# Patient Record
Sex: FEMALE | Race: BLACK OR AFRICAN AMERICAN | NOT HISPANIC OR LATINO | Employment: STUDENT | URBAN - METROPOLITAN AREA
[De-identification: names, ages, dates, MRNs, and addresses within clinical notes are randomized per-mention and may not be internally consistent; named-entity substitution may affect disease eponyms.]

---

## 2017-01-18 ENCOUNTER — OFFICE VISIT (OUTPATIENT)
Dept: SPORTS MEDICINE | Facility: CLINIC | Age: 22
End: 2017-01-18
Payer: MEDICAID

## 2017-01-18 VITALS
BODY MASS INDEX: 28.25 KG/M2 | HEART RATE: 63 BPM | DIASTOLIC BLOOD PRESSURE: 71 MMHG | HEIGHT: 67 IN | WEIGHT: 180 LBS | SYSTOLIC BLOOD PRESSURE: 130 MMHG

## 2017-01-18 DIAGNOSIS — S80.02XS: ICD-10-CM

## 2017-01-18 DIAGNOSIS — M76.899 QUADRICEPS TENDINITIS: ICD-10-CM

## 2017-01-18 DIAGNOSIS — S80.02XD CONTUSION OF LEFT KNEE, SUBSEQUENT ENCOUNTER: Primary | ICD-10-CM

## 2017-01-18 DIAGNOSIS — M76.52 PATELLAR TENDINITIS OF LEFT KNEE: ICD-10-CM

## 2017-01-18 DIAGNOSIS — S80.02XD CONTUSION OF LEFT PATELLA, SUBSEQUENT ENCOUNTER: ICD-10-CM

## 2017-01-18 DIAGNOSIS — M25.562 ACUTE PAIN OF LEFT KNEE: ICD-10-CM

## 2017-01-18 PROCEDURE — 99213 OFFICE O/P EST LOW 20 MIN: CPT | Mod: PBBFAC,PO | Performed by: ORTHOPAEDIC SURGERY

## 2017-01-18 PROCEDURE — 99214 OFFICE O/P EST MOD 30 MIN: CPT | Mod: S$PBB,,, | Performed by: ORTHOPAEDIC SURGERY

## 2017-01-18 PROCEDURE — 99999 PR PBB SHADOW E&M-EST. PATIENT-LVL III: CPT | Mod: PBBFAC,,, | Performed by: ORTHOPAEDIC SURGERY

## 2017-01-18 RX ORDER — MELOXICAM 15 MG/1
15 TABLET ORAL 2 TIMES DAILY
Qty: 30 TABLET | Refills: 2 | Status: SHIPPED | OUTPATIENT
Start: 2017-01-18 | End: 2017-07-17

## 2017-01-18 NOTE — MR AVS SNAPSHOT
[facility]  [address]  [facility] [address]  Phone: [phone]                  Jody Cid   2017 2:15 PM   Office Visit    Description:  Female : 1995   Provider:  Uyen Avendaño MD   Department:  [facility]           Reason for Visit     Left Knee - Follow-up           Diagnoses this Visit        Comments    Contusion of left knee, subsequent encounter    -  Primary     Contusion of left patella, subsequent encounter         Patellar tendinitis of left knee         Acute pain of left knee         Quadriceps tendinitis         Contusion of left patella, sequela                To Do List           Goals (5 Years of Data)     None      Follow-Up and Disposition     Return in about 6 weeks (around 3/1/2017) for RTC in 6 weeks with Dr. Uyen Avendaño Patient will fill out IKDC, SF-12 and KOOS on return..       These Medications        Disp Refills Start End    meloxicam (MOBIC) 15 MG tablet 30 tablet 2 2017     Take 1 tablet (15 mg total) by mouth 2 (two) times daily. - Oral    Pharmacy: [facility] [address] [place] [address] - [address] Ph #: [phone]         [facility] On Call     [facility] On Call Nurse Care Line -  Assistance  Registered nurses in the [facility] Center provide clinical advisement, health education, appointment booking, and other advisory services.  Call for this free service at [phone].             Medications           Message regarding Medications     Verify the changes and/or additions to your medication regime listed below are the same as discussed with your clinician today.  If any of these changes or additions are incorrect, please notify your healthcare provider.        CHANGE how you are taking these medications     Start Taking Instead of    meloxicam (MOBIC) 15 MG tablet meloxicam (MOBIC) 15 MG tablet    Dosage:  Take 1 tablet (15 mg total) by mouth 2 (two) times daily. Dosage:  Take 1  "tablet (15 mg total) by mouth once daily.    Reason for Change:  Reorder            Verify that the below list of medications is an accurate representation of the medications you are currently taking.  If none reported, the list may be blank. If incorrect, please contact your healthcare provider. Carry this list with you in case of emergency.           Current Medications     esomeprazole (NEXIUM) 40 MG capsule Take 1 capsule (40 mg total) by mouth before breakfast.    esomeprazole (NEXIUM) 40 mg GrPS Take 40 mg by mouth before breakfast.    indomethacin (INDOCIN SR) 75 mg CpSR CR capsule Take 1 capsule (75 mg total) by mouth 2 (two) times daily.    meloxicam (MOBIC) 15 MG tablet Take 1 tablet (15 mg total) by mouth 2 (two) times daily.           Clinical Reference Information           Vital Signs - Last Recorded  Most recent update: 1/18/2017  3:09 PM by Natalya Mcgarry MA    BP Pulse Ht Wt BMI    130/71 (96 %/ 70 %)* 63 5' 7" (1.702 m) (86 %, Z= 1.07) 81.6 kg (180 lb) (95 %, Z= 1.61) 28.19 kg/m2 (91 %, Z= 1.31)    *BP percentiles are based on NHBPEP's 4th Report    Growth percentiles are based on CDC 2-20 Years data.      Blood Pressure          Most Recent Value    BP  130/71      Allergies as of 1/18/2017     No Known Allergies      Immunizations Administered on Date of Encounter - 1/18/2017     None      Orders Placed During Today's Visit      Normal Orders This Visit    Ambulatory Referral to Physical/Occupational Therapy       [facility] Sign-Up     Activating your [facility] account is as easy as 1-2-3!     1) Visit [email or web address], select Sign Up Now, enter this activation code and your date of birth, then select Next.  [email or web address]  Expires: 1/27/2017  1:01 PM      2) Create a username and password to use when you visit [facility] in the future and select a security question in case you lose your password and select Next.    3) Enter your e-mail address and click Sign Up!    Additional " Information  If you have questions, please e-mail [email or web address] or call [phone] to talk to our [facility] staff. Remember, [facility] is NOT to be used for urgent needs. For medical emergencies, dial 911.

## 2017-01-18 NOTE — PROGRESS NOTES
Subjective:          Chief Complaint: Jody Cid is a 21 y.o. female who had concerns including Follow-up of the Left Knee.    HPI Comments: Patient is here for a follow up for her left knee. She is still using crutches and resting. Medicaid denied the exogen bone stimulator. She has not been doing exercises since her last visit.     She got her  brace with [employer] today. She is able to weight bear more comfortably with the  brace.     Handedness: right-handed  Sport played: basketball      Level: college      Position:gatitid          Review of Systems   Constitution: Negative for fever and night sweats.   HENT: Negative for hearing loss.    Eyes: Negative for blurred vision and visual disturbance.   Cardiovascular: Negative for chest pain and leg swelling.   Respiratory: Negative for shortness of breath.    Endocrine: Negative for polyuria.   Hematologic/Lymphatic: Negative for bleeding problem.   Skin: Negative for rash.   Musculoskeletal: Negative for back pain, joint pain, joint swelling, muscle cramps and muscle weakness.   Gastrointestinal: Negative for melena.   Genitourinary: Negative for hematuria.   Neurological: Negative for loss of balance, numbness and paresthesias.   Psychiatric/Behavioral: Negative for altered mental status.       Pain Related Questions  Over the past 3 days, what was your average pain during activity? (I.e. running, jogging, walking, climbing stairs, getting dressed, ect.): 5  Over the past 3 days, what was your highest pain level?: 7  Over the past 3 days, what was your lowest pain level? : 3    Other  How many nights a week are you awakened by your affected body part?: 7  Was the patient's HEIGHT measured or patient reported?: Patient Reported  Was the patient's WEIGHT measured or patient reported?: Measured      Objective:        General: Jody is well-developed, well-nourished, appears stated age, in no acute distress, alert and oriented to time, place  and person.     General    Vitals reviewed.  Constitutional: She is oriented to person, place, and time. She appears well-developed and well-nourished. No distress.   HENT:   Mouth/Throat: No oropharyngeal exudate.   Eyes: Right eye exhibits no discharge. Left eye exhibits no discharge.   Neck: Normal range of motion.   Pulmonary/Chest: Effort normal and breath sounds normal. No respiratory distress.   Neurological: She is alert and oriented to person, place, and time. She has normal reflexes. No cranial nerve deficit. Coordination normal.   Psychiatric: She has a normal mood and affect. Her behavior is normal. Judgment and thought content normal.     General Musculoskeletal Exam   Gait: normal       Right Knee Exam     Inspection   Erythema: absent  Scars: absent  Swelling: absent  Effusion: effusion  Deformity: deformity  Bruising: absent    Tenderness   The patient is experiencing no tenderness.         Range of Motion   Extension: 0   Right knee flexion: 135.     Tests   Meniscus   Elijah:  Medial - negative Lateral - negative  Ligament Examination Lachman: normal (-1 to 2mm) PCL-Posterior Drawer: normal (0 to 2mm)     MCL - Valgus: normal (0 to 2mm)  LCL - Varus: normalPivot Shift: normal (Equal)Reverse Pivot Shift: normal (Equal)Dial Test at 30 degrees: normal (< 5 degrees)Dial Test at 90 degrees: normal (< 5 degrees)  Posterior Sag Test: negative  Posterolateral Corner: unstable (>15 degrees difference)  Patella   Patellar Apprehension: negative  Passive Patellar Tilt: neutral  Patellar Tracking: normal  Patellar Glide (quadrants): Lateral - 1   Medial - 2  Q-Angle at 90 degrees: normal  Patellar Grind: negative  J-Sign: none    Other   Meniscal Cyst: absent  Popliteal (Baker's) Cyst: absent  Sensation: normal    Left Knee Exam     Inspection   Erythema: absent  Scars: present  Swelling: absent  Effusion: absent  Deformity: deformity  Bruising: absent    Tenderness   The patient tender to palpation of the  lateral joint line, lateral retinaculum and tibial tubercle.    Range of Motion   Extension:  5 abnormal   Flexion:  110 abnormal     Tests   Meniscus   Elijah:  Medial - negative Lateral - negative  Stability Lachman: normal (-1 to 2mm) PCL-Posterior Drawer: normal (0 to 2mm)  MCL - Valgus: normal (0 to 2mm)  LCL - Varus: normal (0 to 2mm)Pivot Shift: normal (Equal)Reverse Pivot Shift: normal (Equal)Dial Test at 30 degrees: normal (< 5 degrees)Dial Test at 90 degrees: normal (< 5 degrees)  Posterior Sag Test: negative  Posterolateral Corner: unstable (>15 degrees difference)  Patella   Patellar Apprehension: negative  Passive Patellar Tilt: neutral  Patellar Tracking: normal  Patellar Glide (Quadrants): Lateral - 1 Medial - 2  Q-Angle at 90 degrees: normal  Patellar Grind: negative  J-Sign: J sign absent    Other   Meniscal Cyst: absent  Popliteal (Baker's) Cyst: absent  Sensation: normal    Right Hip Exam     Tests   Deandra: negative  Left Hip Exam     Tests   Deandra: negative          Muscle Strength   Right Lower Extremity   Hip Abduction: 5/5   Quadriceps:  5/5   Hamstrin/5   Left Lower Extremity   Hip Abduction: 5/5   Quadriceps:  4/5   Hamstrin/5     Reflexes     Left Side  Quadriceps:  2+  Achilles:  2+    Right Side   Quadriceps:  2+  Achilles:  2+    Vascular Exam     Right Pulses  Dorsalis Pedis:      2+  Posterior Tibial:      2+        Left Pulses  Dorsalis Pedis:      2+  Posterior Tibial:      2+        Edema  Right Lower Leg: absent  Left Lower Leg: absent              Assessment:       No diagnosis found.       Plan:         1. IKDC, SF-12 and KOOS was not filled out today in clinic.     RTC in 6 weeks with Dr. Uyen Avendaño Patient will fill out IKDC, SF-12 and KOOS on return.    2. Initiate PT at this time - [facility] [place]    3. Wear  brace for all weight bearing - increase to WBAT over the next 2 weeks    4. If Symptoms continue, may consider arthroscopy                             [facility] patient questionnaires have been collected today.

## 2017-03-08 ENCOUNTER — OFFICE VISIT (OUTPATIENT)
Dept: SPORTS MEDICINE | Facility: CLINIC | Age: 22
End: 2017-03-08
Payer: MEDICAID

## 2017-03-08 DIAGNOSIS — G89.29 CHRONIC PAIN OF LEFT KNEE: ICD-10-CM

## 2017-03-08 DIAGNOSIS — S80.02XS: ICD-10-CM

## 2017-03-08 DIAGNOSIS — M76.899 QUADRICEPS TENDINITIS: ICD-10-CM

## 2017-03-08 DIAGNOSIS — M25.562 CHRONIC PAIN OF LEFT KNEE: ICD-10-CM

## 2017-03-08 DIAGNOSIS — M76.52 PATELLAR TENDINITIS OF LEFT KNEE: ICD-10-CM

## 2017-03-08 DIAGNOSIS — M23.92 INTERNAL DERANGEMENT OF LEFT KNEE: ICD-10-CM

## 2017-03-08 DIAGNOSIS — S80.02XS CONTUSION OF LEFT KNEE, SEQUELA: Primary | ICD-10-CM

## 2017-03-08 PROCEDURE — 99212 OFFICE O/P EST SF 10 MIN: CPT | Mod: PBBFAC,PO | Performed by: ORTHOPAEDIC SURGERY

## 2017-03-08 PROCEDURE — 99214 OFFICE O/P EST MOD 30 MIN: CPT | Mod: S$PBB,,, | Performed by: ORTHOPAEDIC SURGERY

## 2017-03-08 PROCEDURE — 99999 PR PBB SHADOW E&M-EST. PATIENT-LVL II: CPT | Mod: PBBFAC,,, | Performed by: ORTHOPAEDIC SURGERY

## 2017-03-08 NOTE — PROGRESS NOTES
Subjective:          Chief Complaint: Jody Cid is a 21 y.o. female who had no chief complaint listed for this encounter.    HPI Comments: Patient is here for a follow up for her left knee. She did not go to physical therapy after her last visit. She still wears the  brace. She has been working out on her own. She has been running and shooting a little bit. She states that her pain is more of an ache. She walks with a limp. She is not using her crutches.     Handedness: right-handed  Sport played: basketball      Level: college      Position:gaurd          Review of Systems   Constitution: Negative for fever and night sweats.   HENT: Negative for hearing loss.    Eyes: Negative for blurred vision and visual disturbance.   Cardiovascular: Negative for chest pain and leg swelling.   Respiratory: Negative for shortness of breath.    Endocrine: Negative for polyuria.   Hematologic/Lymphatic: Negative for bleeding problem.   Skin: Negative for rash.   Musculoskeletal: Negative for back pain, joint pain, joint swelling, muscle cramps and muscle weakness.   Gastrointestinal: Negative for melena.   Genitourinary: Negative for hematuria.   Neurological: Negative for loss of balance, numbness and paresthesias.   Psychiatric/Behavioral: Negative for altered mental status.                   Objective:        General: Jody is well-developed, well-nourished, appears stated age, in no acute distress, alert and oriented to time, place and person.     General    Vitals reviewed.  Constitutional: She is oriented to person, place, and time. She appears well-developed and well-nourished. No distress.   HENT:   Mouth/Throat: No oropharyngeal exudate.   Eyes: Right eye exhibits no discharge. Left eye exhibits no discharge.   Neck: Normal range of motion.   Pulmonary/Chest: Effort normal and breath sounds normal. No respiratory distress.   Neurological: She is alert and oriented to person, place, and time. She  has normal reflexes. No cranial nerve deficit. Coordination normal.   Psychiatric: She has a normal mood and affect. Her behavior is normal. Judgment and thought content normal.     General Musculoskeletal Exam   Gait: normal       Right Knee Exam     Inspection   Erythema: absent  Scars: absent  Swelling: absent  Effusion: effusion  Deformity: deformity  Bruising: absent    Tenderness   The patient is experiencing no tenderness.         Range of Motion   Extension: 5   Flexion: 140     Tests   Meniscus   Elijah:  Medial - negative Lateral - negative  Ligament Examination Lachman: normal (-1 to 2mm) PCL-Posterior Drawer: normal (0 to 2mm)     MCL - Valgus: normal (0 to 2mm)  LCL - Varus: normalPivot Shift: normal (Equal)Reverse Pivot Shift: normal (Equal)Dial Test at 30 degrees: normal (< 5 degrees)Dial Test at 90 degrees: normal (< 5 degrees)  Posterior Sag Test: negative  Posterolateral Corner: unstable (>15 degrees difference)  Patella   Patellar Apprehension: negative  Passive Patellar Tilt: neutral  Patellar Tracking: normal  Patellar Glide (quadrants): Lateral - 1   Medial - 2  Q-Angle at 90 degrees: normal  Patellar Grind: negative  J-Sign: none    Other   Meniscal Cyst: absent  Popliteal (Baker's) Cyst: absent  Sensation: normal    Left Knee Exam     Inspection   Erythema: absent  Scars: present  Swelling: absent  Effusion: absent  Deformity: deformity  Bruising: absent    Tenderness   The patient tender to palpation of the lateral joint line, lateral retinaculum, tibial tubercle and patella.    Crepitus   The patient has crepitus of the patella.    Range of Motion   Extension: 10   Flexion: 140     Tests   Meniscus   Elijah:  Medial - negative Lateral - negative  Stability Lachman: normal (-1 to 2mm) PCL-Posterior Drawer: normal (0 to 2mm)  MCL - Valgus: normal (0 to 2mm)  LCL - Varus: normal (0 to 2mm)Pivot Shift: normal (Equal)Reverse Pivot Shift: normal (Equal)Dial Test at 30 degrees: normal (< 5  degrees)Dial Test at 90 degrees: normal (< 5 degrees)  Posterior Sag Test: negative  Posterolateral Corner: unstable (>15 degrees difference)  Patella   Patellar Apprehension: negative  Passive Patellar Tilt: neutral  Patellar Tracking: normal  Patellar Glide (Quadrants): Lateral - 1 Medial - 2  Q-Angle at 90 degrees: normal  Patellar Grind: positive  J-Sign: J sign absent    Other   Meniscal Cyst: absent  Popliteal (Baker's) Cyst: absent  Sensation: normal    Right Hip Exam     Tests   Deandra: negative  Left Hip Exam     Tests   Deandra: negative          Muscle Strength   Right Lower Extremity   Hip Abduction: 5/5   Quadriceps:  5/5   Hamstrin/5   Left Lower Extremity   Hip Abduction: 5/5   Quadriceps:  4/5   Hamstrin/5     Reflexes     Left Side  Quadriceps:  2+  Achilles:  2+    Right Side   Quadriceps:  2+  Achilles:  2+    Vascular Exam     Right Pulses  Dorsalis Pedis:      2+  Posterior Tibial:      2+        Left Pulses  Dorsalis Pedis:      2+  Posterior Tibial:      2+        Edema  Right Lower Leg: absent  Left Lower Leg: absent              Assessment:       Encounter Diagnoses   Name Primary?    Contusion of left knee, sequela Yes    Contusion of left patella, sequela     Quadriceps tendinitis     Patellar tendinitis of left knee     Internal derangement of left knee     Chronic pain of left knee           Plan:         1. IKDC, SF-12 and KOOS was filled out today in clinic.     RTC in 3months with Dr. Uyen Avendaño Patient will fill out IKDC, SF-12 and KOOS on return.    2. [facility] PT per protocol - new referral placed today    3. Continue  brace for all weight bearing                                 [facility] patient questionnaires have been collected today.

## 2017-03-08 NOTE — MR AVS SNAPSHOT
[facility]  [address]  [facility] [address]  Phone: [phone]                  Jody Cid   3/8/2017 3:15 PM   Office Visit    Description:  Female : 1995   Provider:  Uyen Avendaño MD   Department:  [facility]           Diagnoses this Visit        Comments    Contusion of left knee, sequela    -  Primary     Contusion of left patella, sequela         Quadriceps tendinitis         Patellar tendinitis of left knee         Internal derangement of left knee         Chronic pain of left knee                To Do List           Goals (5 Years of Data)     None      Follow-Up and Disposition     Return in about 3 months (around 2017) for RTC in 3months with Dr. Uyen Avendaño Patient will fill out IKDC, SF-12 and KOOS on return..      [facility]     [facility] On Call Nurse Care Line -  Assistance  Registered nurses in the [facility] On Call Center provide clinical advisement, health education, appointment booking, and other advisory services.  Call for this free service at [phone].             Medications           Message regarding Medications     Verify the changes and/or additions to your medication regime listed below are the same as discussed with your clinician today.  If any of these changes or additions are incorrect, please notify your healthcare provider.             Verify that the below list of medications is an accurate representation of the medications you are currently taking.  If none reported, the list may be blank. If incorrect, please contact your healthcare provider. Carry this list with you in case of emergency.           Current Medications     esomeprazole (NEXIUM) 40 MG capsule Take 1 capsule (40 mg total) by mouth before breakfast.    esomeprazole (NEXIUM) 40 mg GrPS Take 40 mg by mouth before breakfast.    indomethacin (INDOCIN SR) 75 mg CpSR CR capsule Take 1 capsule (75 mg total) by mouth 2 (two) times  daily.    meloxicam (MOBIC) 15 MG tablet Take 1 tablet (15 mg total) by mouth 2 (two) times daily.           Clinical Reference Information           Allergies as of 3/8/2017     No Known Allergies      Immunizations Administered on Date of Encounter - 3/8/2017     None      Orders Placed During Today's Visit      Normal Orders This Visit    Ambulatory Referral to Physical/Occupational Therapy       [facility] Sign-Up     Activating your [facility] account is as easy as 1-2-3!     1) Visit [email or web address], select Sign Up Now, enter this activation code and your date of birth, then select Next.  [email or web address]  Expires: 4/21/2017  2:09 PM      2) Create a username and password to use when you visit [facility] in the future and select a security question in case you lose your password and select Next.    3) Enter your e-mail address and click Sign Up!    Additional Information  If you have questions, please e-mail [email or web address] or call [phone] to talk to our [facility] staff. Remember, [facility] is NOT to be used for urgent needs. For medical emergencies, dial 911.         Language Assistance Services     ATTENTION: Language assistance services are available, free of charge. Please call [phone].      ATENCIÓN: Si habla español, tiene a colon disposición servicios gratuitos de asistencia lingüística. Llame al [phone].     MIHAELA Ý: N?u b?n nói Ti?ng Vi?t, có các d?ch v? h? tr? ngôn ng? mi?n phí dành cho b?n. G?i s? [phone].         [facility] complies with applicable Federal civil rights laws and does not discriminate on the basis of race, color, national origin, age, disability, or sex.

## 2017-07-17 ENCOUNTER — OFFICE VISIT (OUTPATIENT)
Dept: SPORTS MEDICINE | Facility: CLINIC | Age: 22
End: 2017-07-17
Payer: MEDICAID

## 2017-07-17 VITALS
WEIGHT: 175 LBS | SYSTOLIC BLOOD PRESSURE: 127 MMHG | BODY MASS INDEX: 27.47 KG/M2 | HEIGHT: 67 IN | HEART RATE: 62 BPM | DIASTOLIC BLOOD PRESSURE: 69 MMHG

## 2017-07-17 DIAGNOSIS — S80.02XD CONTUSION OF LEFT KNEE, SUBSEQUENT ENCOUNTER: Primary | ICD-10-CM

## 2017-07-17 DIAGNOSIS — M23.92 INTERNAL DERANGEMENT OF LEFT KNEE: ICD-10-CM

## 2017-07-17 DIAGNOSIS — S80.02XD CONTUSION OF LEFT PATELLA, SUBSEQUENT ENCOUNTER: ICD-10-CM

## 2017-07-17 PROCEDURE — 99999 PR PBB SHADOW E&M-EST. PATIENT-LVL III: CPT | Mod: PBBFAC,,, | Performed by: ORTHOPAEDIC SURGERY

## 2017-07-17 PROCEDURE — 99214 OFFICE O/P EST MOD 30 MIN: CPT | Mod: 25,S$PBB,, | Performed by: ORTHOPAEDIC SURGERY

## 2017-07-17 PROCEDURE — 97110 THERAPEUTIC EXERCISES: CPT | Mod: GP,S$PBB,, | Performed by: ORTHOPAEDIC SURGERY

## 2017-07-17 PROCEDURE — 99213 OFFICE O/P EST LOW 20 MIN: CPT | Mod: PBBFAC,PO | Performed by: ORTHOPAEDIC SURGERY

## 2017-07-17 NOTE — PROGRESS NOTES
Subjective:          Chief Complaint: Jody Cid is a 21 y.o. female who had concerns including Follow-up of the Left Knee.    Patient is here for a follow up for her left knee. She is doing better since physical therapy but her knee will bother her when she stands for long periods of time.      She plans to transfer to [employer] to play basketball next year      Handedness: right-handed  Sport played: basketball      Level: college      Position:gaurd          Review of Systems   Constitution: Negative for fever and night sweats.   HENT: Negative for hearing loss.    Eyes: Negative for blurred vision and visual disturbance.   Cardiovascular: Negative for chest pain and leg swelling.   Respiratory: Negative for shortness of breath.    Endocrine: Negative for polyuria.   Hematologic/Lymphatic: Negative for bleeding problem.   Skin: Negative for rash.   Musculoskeletal: Negative for back pain, joint pain, joint swelling, muscle cramps and muscle weakness.   Gastrointestinal: Negative for melena.   Genitourinary: Negative for hematuria.   Neurological: Negative for loss of balance, numbness and paresthesias.   Psychiatric/Behavioral: Negative for altered mental status.       Pain Related Questions  Over the past 3 days, what was your average pain during activity? (I.e. running, jogging, walking, climbing stairs, getting dressed, ect.): 1  Over the past 3 days, what was your highest pain level?: 2  Over the past 3 days, what was your lowest pain level? : 0    Other  How many nights a week are you awakened by your affected body part?: 0  Was the patient's HEIGHT measured or patient reported?: Patient Reported  Was the patient's WEIGHT measured or patient reported?: Measured      Objective:        General: Jody is well-developed, well-nourished, appears stated age, in no acute distress, alert and oriented to time, place and person.     General    Vitals reviewed.  Constitutional: She is oriented to person,  place, and time. She appears well-developed and well-nourished. No distress.   HENT:   Mouth/Throat: No oropharyngeal exudate.   Eyes: Right eye exhibits no discharge. Left eye exhibits no discharge.   Neck: Normal range of motion.   Pulmonary/Chest: Effort normal and breath sounds normal. No respiratory distress.   Neurological: She is alert and oriented to person, place, and time. She has normal reflexes. No cranial nerve deficit. Coordination normal.   Psychiatric: She has a normal mood and affect. Her behavior is normal. Judgment and thought content normal.     General Musculoskeletal Exam   Gait: normal       Right Knee Exam     Inspection   Erythema: absent  Scars: absent  Swelling: absent  Effusion: effusion  Deformity: deformity  Bruising: absent    Tenderness   The patient is experiencing no tenderness.         Crepitus   The patient has crepitus of the patella.    Range of Motion   Extension: -10   Flexion: 130     Tests   Meniscus   Elijah:  Medial - negative Lateral - negative  Ligament Examination Lachman: normal (-1 to 2mm) PCL-Posterior Drawer: normal (0 to 2mm)     MCL - Valgus: normal (0 to 2mm)  LCL - Varus: normalPivot Shift: normal (Equal)Reverse Pivot Shift: normal (Equal)Dial Test at 30 degrees: normal (< 5 degrees)Dial Test at 90 degrees: normal (< 5 degrees)  Posterior Sag Test: negative  Posterolateral Corner: unstable (>15 degrees difference)  Patella   Patellar Apprehension: negative  Passive Patellar Tilt: neutral  Patellar Tracking: normal  Patellar Glide (quadrants): Lateral - 1   Medial - 2  Q-Angle at 90 degrees: normal  Patellar Grind: negative  J-Sign: none    Other   Meniscal Cyst: absent  Popliteal (Baker's) Cyst: absent  Sensation: normal    Left Knee Exam     Inspection   Erythema: absent  Scars: present  Swelling: absent  Effusion: absent  Deformity: deformity  Bruising: absent    Tenderness   The patient tender to palpation of the lateral joint line, lateral retinaculum,  tibial tubercle and patella.    Crepitus   The patient has crepitus of the patella.    Range of Motion   Extension: -10   Flexion: 130     Tests   Meniscus   Elijah:  Medial - negative Lateral - negative  Stability Lachman: normal (-1 to 2mm) PCL-Posterior Drawer: normal (0 to 2mm)  MCL - Valgus: normal (0 to 2mm)  LCL - Varus: normal (0 to 2mm)Pivot Shift: normal (Equal)Reverse Pivot Shift: normal (Equal)Dial Test at 30 degrees: normal (< 5 degrees)Dial Test at 90 degrees: normal (< 5 degrees)  Posterior Sag Test: negative  Posterolateral Corner: unstable (>15 degrees difference)  Patella   Patellar Apprehension: negative  Passive Patellar Tilt: neutral  Patellar Tracking: normal  Patellar Glide (Quadrants): Lateral - 1 Medial - 2  Q-Angle at 90 degrees: normal  Patellar Grind: negative  J-Sign: J sign absent    Other   Meniscal Cyst: absent  Popliteal (Baker's) Cyst: absent  Sensation: normal    Right Hip Exam     Tests   Deandra: negative  Left Hip Exam     Tests   Deandra: negative          Muscle Strength   Right Lower Extremity   Hip Abduction: 5/5   Quadriceps:  5/5   Hamstrin/5   Left Lower Extremity   Hip Abduction: 5/5   Quadriceps:  4/5   Hamstrin/5     Reflexes     Left Side  Quadriceps:  2+  Achilles:  2+    Right Side   Quadriceps:  2+  Achilles:  2+    Vascular Exam     Right Pulses  Dorsalis Pedis:      2+  Posterior Tibial:      2+        Left Pulses  Dorsalis Pedis:      2+  Posterior Tibial:      2+        Edema  Right Lower Leg: absent  Left Lower Leg: absent              Assessment:       Encounter Diagnoses   Name Primary?    Contusion of left knee, subsequent encounter Yes    Contusion of left patella, subsequent encounter     Internal derangement of left knee           Plan:         1. IKDC, SF-12 and KOOS was filled out today in clinic.     RTC in 3 months with Dr. Uyen Avendaño Patient will fill out IKDC, SF-12 and KOOS and bilateral knee series on return.    2. [facility] [id number] - Uyen  MD Jarocho and Ginny Landaverde, instructed and demonstrated a core/gluteal strengthening HEP. The patient then demonstrated understanding of exercises and proper technique. This program was performed for 15 minutes.     3. [facility] - sports specific training                                [facility] patient questionnaires have been collected today.

## 2018-10-22 ENCOUNTER — OFFICE VISIT (OUTPATIENT)
Dept: ORTHOPEDICS | Facility: CLINIC | Age: 23
End: 2018-10-22
Payer: MEDICAID

## 2018-10-22 ENCOUNTER — HOSPITAL ENCOUNTER (OUTPATIENT)
Dept: RADIOLOGY | Facility: HOSPITAL | Age: 23
Discharge: HOME | End: 2018-10-22
Attending: PHYSICIAN ASSISTANT
Payer: MEDICAID

## 2018-10-22 VITALS
DIASTOLIC BLOOD PRESSURE: 62 MMHG | WEIGHT: 175.06 LBS | SYSTOLIC BLOOD PRESSURE: 97 MMHG | HEIGHT: 67 IN | HEART RATE: 51 BPM | BODY MASS INDEX: 27.48 KG/M2

## 2018-10-22 DIAGNOSIS — M79.641 RIGHT HAND PAIN: Primary | ICD-10-CM

## 2018-10-22 DIAGNOSIS — M79.641 RIGHT HAND PAIN: ICD-10-CM

## 2018-10-22 DIAGNOSIS — M79.644 FINGER PAIN, RIGHT: ICD-10-CM

## 2018-10-22 DIAGNOSIS — M79.641 PAIN OF RIGHT HAND: ICD-10-CM

## 2018-10-22 DIAGNOSIS — M79.644 PAIN OF FINGER OF RIGHT HAND: Primary | ICD-10-CM

## 2018-10-22 DIAGNOSIS — S63.264A: Primary | ICD-10-CM

## 2018-10-22 DIAGNOSIS — M79.641 PAIN OF RIGHT HAND: Primary | ICD-10-CM

## 2018-10-22 PROBLEM — S63.269A: Status: ACTIVE | Noted: 2018-10-22

## 2018-10-22 PROCEDURE — 73130 X-RAY EXAM OF HAND: CPT | Mod: 26,RT,, | Performed by: RADIOLOGY

## 2018-10-22 PROCEDURE — 99213 OFFICE O/P EST LOW 20 MIN: CPT | Mod: PBBFAC,25,PO | Performed by: PHYSICIAN ASSISTANT

## 2018-10-22 PROCEDURE — 73130 X-RAY EXAM OF HAND: CPT | Mod: TC,FY,PO,RT

## 2018-10-22 PROCEDURE — 73130 X-RAY EXAM OF HAND: CPT | Mod: 26,XS,RT, | Performed by: RADIOLOGY

## 2018-10-22 PROCEDURE — 99999 PR PBB SHADOW E&M-EST. PATIENT-LVL III: CPT | Mod: PBBFAC,,, | Performed by: PHYSICIAN ASSISTANT

## 2018-10-22 PROCEDURE — 29131 APPL FINGER SPLINT DYNAMIC: CPT | Mod: PBBFAC,PO | Performed by: PHYSICIAN ASSISTANT

## 2018-10-22 PROCEDURE — 99214 OFFICE O/P EST MOD 30 MIN: CPT | Mod: S$PBB,25,, | Performed by: PHYSICIAN ASSISTANT

## 2018-10-22 PROCEDURE — 29131 APPL FINGER SPLINT DYNAMIC: CPT | Mod: S$PBB,F8,, | Performed by: PHYSICIAN ASSISTANT

## 2018-10-22 RX ORDER — MELOXICAM 15 MG/1
15 TABLET ORAL DAILY
Qty: 30 TABLET | Refills: 2 | Status: SHIPPED | OUTPATIENT
Start: 2018-10-22 | End: 2018-11-21

## 2018-10-22 NOTE — LETTER
October 22, 2018      Luis Perdomo   [address] [facility] [address]  [place] [address]           [facility]  [address]  [place] [address]  Phone: [phone]  Fax: [phone]          Patient: Jody Cid   MR Number: [id number]   YOB: 1995   Date of Visit: 10/22/2018       Dear Luis Perdomo :    Thank you for referring Jody Cid to me for evaluation. Attached you will find relevant portions of my assessment and plan of care.    If you have questions, please do not hesitate to call me. I look forward to following Jody Cid along with you.    Sincerely,    Jyoti Borja PA-C    Enclosure  CC:  No Recipients    If you would like to receive this communication electronically, please contact [email or web address] or [phone] to request more information on [facility] access.    For providers and/or their staff who would like to refer a patient to [facility], please contact us through our one-stop-shop provider referral line, [facility], at [phone].    If you feel you have received this communication in error or would no longer like to receive these types of communications, please e-mail [email or web address]

## 2018-10-22 NOTE — PROGRESS NOTES
Subjective:     Patient ID: Jody Cdi is a 23 y.o. female.    Chief Complaint: Injury and Pain of the Right Hand    Ms. Cid is a 23-year-old female who presents for right ring finger pain. 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  She presents with the [facility] . Around 10 a.m. this morning, she was guarding a teammate and her right ring finger dislocated.  Her finger was reduced by the [facility] .  The  reports a volar dislocation at site of MCP joint of the right ring finger.  The  reports associated dorsal skin depression to right ring finger MCP joint. The patient complains of numbness ring finger and swelling.  The patient denies tingling.  She rates her pain as an 8/10.  She has no history of dislocation in the past.  Ice was applied to the finger in transit to our office.         History reviewed. No pertinent past medical history.  History reviewed. No pertinent surgical history.  History reviewed. No pertinent family history.  Social History     Socioeconomic History    Marital status: Single     Spouse name: Not on file    Number of children: Not on file    Years of education: Not on file    Highest education level: Not on file   Social Needs    Financial resource strain: Not on file    Food insecurity - worry: Not on file    Food insecurity - inability: Not on file    Transportation needs - medical: Not on file    Transportation needs - non-medical: Not on file   Occupational History    Not on file   Tobacco Use    Smoking status: Never Smoker    Smokeless tobacco: Never Used   Substance and Sexual Activity    Alcohol use: No     Alcohol/week: 0.0 oz     Frequency: Never    Drug use: No    Sexual activity: Not on file   Other Topics Concern    Not on file   Social History Narrative    Not on file        Medication List      as of 10/22/2018 11:28 AM     You have not been prescribed any medications.       Review of patient's  allergies indicates:  No Known Allergies  Review of Systems   Constitution: Negative for fever and night sweats.   HENT: Negative for hearing loss.    Eyes: Negative for blurred vision and visual disturbance.   Cardiovascular: Negative for chest pain and leg swelling.   Respiratory: Negative for shortness of breath.    Endocrine: Negative for polyuria.   Hematologic/Lymphatic: Negative for bleeding problem.   Skin: Negative for rash.   Musculoskeletal: Positive for joint pain and joint swelling. Negative for back pain, muscle cramps and muscle weakness.   Gastrointestinal: Negative for melena.   Genitourinary: Negative for hematuria.   Neurological: Negative for loss of balance, numbness and paresthesias.   Psychiatric/Behavioral: Negative for altered mental status.       Objective:   Body mass index is 27.42 kg/m².  Vitals:    10/22/18 1109   BP: 97/62   Pulse: (!) 51       General: Jody is well-developed, well-nourished, appears stated age, in no acute distress, alert and oriented to time, place and person.       General    Constitutional: She is oriented to person, place, and time. She appears well-developed and well-nourished.   HENT:   Head: Normocephalic and atraumatic.   Right Ear: External ear normal.   Left Ear: External ear normal.   Nose: Nose normal.   Eyes: EOM are normal. Pupils are equal, round, and reactive to light. Right eye exhibits no discharge. Left eye exhibits no discharge.   Neck: Normal range of motion.   Cardiovascular: Intact distal pulses.    Pulmonary/Chest: Effort normal. No respiratory distress.   Abdominal: Soft.   Neurological: She is alert and oriented to person, place, and time. She exhibits normal muscle tone.   Psychiatric: She has a normal mood and affect. Her behavior is normal. Judgment and thought content normal.             Right Hand/Wrist Exam     Inspection   Scars: Wrist - absent Hand -  absent  Effusion: Wrist - absent Hand -  absent  Bruising: Wrist - absent Hand -   absent  Deformity: Wrist - deformity Hand -  deformity    Pain   Hand - The patient exhibits pain of the ring MCP.    Swelling   Hand - The patient is swollen on the ring MCP.    Range of Motion     Wrist   Extension:  50 normal   Flexion:  70 normal   Pronation: normal   Supination:  80 normal     Other     Neuorologic Exam    Median Distribution: normal  Ulnar Distribution: normal  Radial Distribution: normal    Comments:  No dislocation or displacement to R ring finger  Mild swelling surrounding R Ring finger MCP joint  Tenderness to palpation at MCP joint and ring finger metacarpal      Left Hand/Wrist Exam     Inspection   Scars: Wrist - absent Hand -  absent  Effusion: Wrist - absent Hand -  absent  Bruising: Wrist - absent Hand -  absent  Deformity: Wrist - absent Hand -  absent    Range of Motion     Wrist   Extension:  50 normal   Flexion:  70 normal   Pronation: normal   Supination:  80 normal     Other     Sensory Exam  Median Distribution: normal  Ulnar Distribution: normal  Radial Distribution: normal          Muscle Strength   Right Upper Extremity   Wrist extension: 5/5/5   Wrist flexion: 5/5/5   Left Upper Extremity  Wrist extension: 5/5/5   Wrist flexion: 5/5/5     Vascular Exam       Capillary Refill  Right Hand: normal capillary refill  Left Hand: normal capillary refill    I have reviewed today's imaging and agree with the radiologist's findings of:   X-ray R hand: Osseous structures intact.  No grossly evident acute or healing fracture or dislocation.  Equivocal appearance minimal soft tissue prominence proximal aspect of the 4th digit.  This reportedly is area of the patient's recent injury.  No soft tissue calcification or radiopaque foreign body noted.    Assessment:     Encounter Diagnoses   Name Primary?    Closed dislocation of metacarpophalangeal joint of finger, initial encounter Yes    Finger pain, right         Plan:     Discussed risk of recurrent dislocation  Dorsal Blocking Splint  placed   Mobic PRN pain   No basketball until reevaluated in 3 weeks   Hand Therapy as soon as splint removed to expedite return to play     Jyoti Borja PA-C  Orthopedic Surgery/Sports Medicine

## 2018-11-12 ENCOUNTER — CLINICAL SUPPORT (OUTPATIENT)
Dept: REHABILITATION | Facility: HOSPITAL | Age: 23
End: 2018-11-12
Attending: PHYSICIAN ASSISTANT
Payer: MEDICAID

## 2018-11-12 ENCOUNTER — OFFICE VISIT (OUTPATIENT)
Dept: ORTHOPEDICS | Facility: CLINIC | Age: 23
End: 2018-11-12
Payer: MEDICAID

## 2018-11-12 ENCOUNTER — HOSPITAL ENCOUNTER (OUTPATIENT)
Dept: RADIOLOGY | Facility: HOSPITAL | Age: 23
Discharge: HOME | End: 2018-11-12
Attending: PHYSICIAN ASSISTANT
Payer: MEDICAID

## 2018-11-12 VITALS — DIASTOLIC BLOOD PRESSURE: 68 MMHG | HEART RATE: 58 BPM | RESPIRATION RATE: 12 BRPM | SYSTOLIC BLOOD PRESSURE: 112 MMHG

## 2018-11-12 DIAGNOSIS — S63.269A CLOSED DISLOCATION OF METACARPOPHALANGEAL JOINT OF FINGER, INITIAL ENCOUNTER: Primary | ICD-10-CM

## 2018-11-12 DIAGNOSIS — M79.644 FINGER PAIN, RIGHT: ICD-10-CM

## 2018-11-12 DIAGNOSIS — M79.644 PAIN OF FINGER OF RIGHT HAND: ICD-10-CM

## 2018-11-12 DIAGNOSIS — S63.264A: Primary | ICD-10-CM

## 2018-11-12 PROCEDURE — 99213 OFFICE O/P EST LOW 20 MIN: CPT | Mod: S$PBB,,, | Performed by: PHYSICIAN ASSISTANT

## 2018-11-12 PROCEDURE — 99999 PR PBB SHADOW E&M-EST. PATIENT-LVL III: CPT | Mod: PBBFAC,,, | Performed by: PHYSICIAN ASSISTANT

## 2018-11-12 PROCEDURE — 73140 X-RAY EXAM OF FINGER(S): CPT | Mod: TC,FY,PO

## 2018-11-12 PROCEDURE — 99213 OFFICE O/P EST LOW 20 MIN: CPT | Mod: PBBFAC,25,PO | Performed by: PHYSICIAN ASSISTANT

## 2018-11-12 PROCEDURE — 97165 OT EVAL LOW COMPLEX 30 MIN: CPT | Mod: PO | Performed by: OCCUPATIONAL THERAPIST

## 2018-11-12 PROCEDURE — 73140 X-RAY EXAM OF FINGER(S): CPT | Mod: 26,F8,, | Performed by: RADIOLOGY

## 2018-11-12 PROCEDURE — 97110 THERAPEUTIC EXERCISES: CPT | Mod: PO | Performed by: OCCUPATIONAL THERAPIST

## 2018-11-12 NOTE — PROGRESS NOTES
Subjective:     Patient ID: Jody Cid is a 23 y.o. female.    Chief Complaint: Finger Injury and Follow-up of the Right Hand       Ms. 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  She presents with the [facility] .  On 10/22/2018, she was guarding a teammate and her right ring finger dislocated. Her finger was reduced by the [facility] .  The  reports a volar dislocation at site of MCP joint of the right ring finger.  The  reports associated dorsal skin depression to right ring finger MCP joint. Associated symptoms included numbness of right ring finger and swelling. She has no history of dislocation in the past.      Today she rates her pain as 0/10.  Dorsal blocking Splint in place during visit and removed in office.  Patient denies numbness, tingling, swelling, and repeat dislocation.  Patient has been sitting out of basketball since last visit.             History reviewed. No pertinent past medical history.  Past Surgical History:   Procedure Laterality Date    NONE       History reviewed. No pertinent family history.  Social History     Socioeconomic History    Marital status: Single     Spouse name: Not on file    Number of children: Not on file    Years of education: Not on file    Highest education level: Not on file   Social Needs    Financial resource strain: Not on file    Food insecurity - worry: Not on file    Food insecurity - inability: Not on file    Transportation needs - medical: Not on file    Transportation needs - non-medical: Not on file   Occupational History    Not on file   Tobacco Use    Smoking status: Never Smoker    Smokeless tobacco: Never Used   Substance and Sexual Activity    Alcohol use: No     Alcohol/week: 0.0 oz     Frequency: Never    Drug use: No    Sexual activity: Not on file   Other Topics Concern     Not on file   Social History Narrative    Not on file        Medication List           Accurate as of 11/12/18  8:29 AM. If you have any questions, ask your nurse or doctor.               CONTINUE taking these medications    meloxicam 15 MG tablet  Commonly known as:  MOBIC  Take 1 tablet (15 mg total) by mouth once daily.          Review of patient's allergies indicates:  No Known Allergies  Review of Systems   Constitution: Negative for fever and night sweats.   HENT: Negative for hearing loss.    Eyes: Negative for blurred vision and visual disturbance.   Cardiovascular: Negative for chest pain and leg swelling.   Respiratory: Negative for shortness of breath.    Endocrine: Negative for polyuria.   Hematologic/Lymphatic: Negative for bleeding problem.   Skin: Negative for rash.   Musculoskeletal: Negative for back pain, joint pain, joint swelling, muscle cramps and muscle weakness.   Gastrointestinal: Negative for melena.   Genitourinary: Negative for hematuria.   Neurological: Negative for loss of balance, numbness and paresthesias.   Psychiatric/Behavioral: Negative for altered mental status.       Objective:   There is no height or weight on file to calculate BMI.  Vitals:    11/12/18 0816   BP: 112/68   Pulse: (!) 58   Resp: 12       General: Jody is well-developed, well-nourished, appears stated age, in no acute distress, alert and oriented to time, place and person.       General    Constitutional: She is oriented to person, place, and time. She appears well-developed and well-nourished.   HENT:   Head: Normocephalic and atraumatic.   Right Ear: External ear normal.   Left Ear: External ear normal.   Nose: Nose normal.   Eyes: EOM are normal. Pupils are equal, round, and reactive to light. Right eye exhibits no discharge. Left eye exhibits no discharge.   Neck: Normal range of motion.   Cardiovascular: Intact distal pulses.    Pulmonary/Chest: Effort normal. No respiratory distress.   Abdominal: Soft.    Neurological: She is alert and oriented to person, place, and time. She exhibits normal muscle tone.   Psychiatric: She has a normal mood and affect. Her behavior is normal. Judgment and thought content normal.             Right Hand/Wrist Exam     Inspection   Scars: Wrist - absent Hand -  absent  Effusion: Wrist - absent Hand -  absent  Bruising: Wrist - absent Hand -  absent  Deformity: Wrist - deformity Hand -  deformity    Range of Motion     Wrist   Extension:  50 normal   Flexion:  70 normal   Pronation: normal   Supination:  80 normal     Other     Neuorologic Exam    Median Distribution: normal  Ulnar Distribution: normal  Radial Distribution: normal    Comments:  Right ring MCP nontender to palpation  Right Thumb/finger flexion intact and equal to left  Right thumb/finger extension intact and equal to left   5/5 bilaterally  Sensation intact and equal bilaterally      Left Hand/Wrist Exam     Inspection   Scars: Wrist - absent Hand -  absent  Effusion: Wrist - absent Hand -  absent  Bruising: Wrist - absent Hand -  absent  Deformity: Wrist - absent Hand -  absent    Range of Motion     Wrist   Extension:  50 normal   Flexion:  70 normal   Pronation: normal   Supination:  80 normal     Other     Sensory Exam  Median Distribution: normal  Ulnar Distribution: normal  Radial Distribution: normal          Muscle Strength   Right Upper Extremity   Wrist extension: 5/5/5   Wrist flexion: 5/5/5   : 5/5/5   Left Upper Extremity  Wrist extension: 5/5/5   Wrist flexion: 5/5/5   :  5/5/5     Vascular Exam       Capillary Refill  Right Hand: normal capillary refill  Left Hand: normal capillary refill    I have personally reviewed the following imaging and agree with the radiologist's findings of:   XR Finger:  No fracture. No dislocation.     Assessment:     Encounter Diagnoses   Name Primary?    Closed dislocation of metacarpophalangeal (MCP) joint of right ring finger Yes    Finger pain, right          Plan:     Patient was educated on higher risk of repeat dislocation in to return to clinic in the event of repeat dislocation  Hand therapy   Graduated return to play  F/u PRN or if symptoms/dislocation return    Patient verbalizes understanding and agrees with the above plan.    Jyoti Borja PA-C  Orthopedic Surgery/Sports Medicine

## 2018-11-12 NOTE — PROGRESS NOTES
"Patient: Jody Cid  Date of Evaluation: 11/12/2018  Referring Physician: Jyoti Moctezuma  Diagnosis: MCP dislocation of the R ring finger    Referral Orders: Eval and treat R RF MCP Joint 3x week for 60 days to include passive,active,resistance exercises.    Start Time: 9:00  End Time: 10:01  Total Time: 60 min  Group Time: NA     Age: 23 y.o.  Sex: female  Hand dominance: right    Occupation: Student [employer]  Working presently: Yes  Last time worked: NA  Workmen's Compensation: No    Date of Injury: 2 weeks ago  Date of Surgery: None  Involved areas: right ring finger    Mechanism of Injury: Hyperextension injury  No past medical history on file.  Current Outpatient Medications   Medication Sig    meloxicam (MOBIC) 15 MG tablet Take 1 tablet (15 mg total) by mouth once daily.     No current facility-administered medications for this visit.      Review of patient's allergies indicates:  No Known Allergies  X-Ray Results: Negative  MRI Results: NT  EMG Results: NT      Subjective:  Jody reports "that she got her splint off today. " She is reporting no pain complaints,however she has not been using her R hand for functional activities.Her  feels that she will be able to progress Jody with her strengthening exercises and she will not need further therapy. Her  further stated that they are losing their basketball games by 30 points since she has been taken out of the game and appears anxious to start her playing again.    THE UPPER EXTREMITY FUNCTIONAL SCALE (UEFS) - The following scores are based on patient reported assessment.      0 = extreme difficulty or unable to perform activity; 1 = quite a bit of difficulty; 2 = moderate difficulty; 3 = a little bit of difficulty; 4 = no difficulty    1 Any of your usual work, housework, or school activities   4/4  2 Your usual hobbies, re creational or sporting activities    4/4  3 Lifting a bag of groceries to waist level      4/4  4 " Lifting a bag of groceries above your head     4/4  5 Grooming your hair         4/4  6 Pushing up on your hands (eg from bathtub or chair)    4/4  7 Preparing food (eg peeling, cutting)      4/4  8 Driving          4/4  9 Vacuuming, sweeping or raking       4/4  10 Dressing          4/4  11 Doing up buttons         4/4  12 Using tools or appliances        4/4  13 Opening doors         4/4  14 Cleaning          4/4  15 Tying or lacing shoes        4/4  16 Sleeping          4/4  17 Laundering clothes (eg washing, ironing, folding)    4/4  18 Opening a jar         4/4  19 Throwing a ball         4/4  20 Carrying a small suitcase with your affected limb    4/4    Minimum Level of Detectable Change (90% Confidence): 9 points SCORE: 80/80    Patient reports 100% ability on the Upper Extremity Functional Scale.      Functional Pain Scale Rating 0-10: 0/10  Location: fingers  Description: NA  Activities which increase pain: None   Activities which decrease pain: None    Jody's goals for therapy are: play basketball      Objective    Observation: Skin intact    Sensation: INTACT    Special Tests:   Deferred    Edema: Circumferential measurements: None noted  L/R proximal.phalanx: 6.8 cm      Range of Motion: right Active  (Ext/Flex) Thumb Index Middle Ring Small   MP    5/90    PIP    0/83 P-92    DIP    0/67 P-82    CALLEJAS        TPM          Thumb Opposition: WNL  Palmar Abduction: WNL  Radial Abduction: WNL    Wrist Ext/Flex: WNL/WNL  Wrist RD/UD: WNL/WNL  Supination/Pronation: WNL/WNL  Elbow extension/flexion: WNL/WNL       Strength: (BREANNE Dynamometer in lbs.) Average 3 trials, Position II  Right: 70#  Left:74#    Pinch Strength: (Pinch Gauge in psi's), Average 3 trials  Lackey Pinch R) 19psi's   L) 19psi's  3pt Pinch   R) 20psi's  L) NTpsi's  2pt Pinch   R) 15.5psi's   L) NTpsi's    Fine Jose Angel Coordination: WNL     Functional Limitations: Patient presents with the following functional Limitations:   Self Care / ADL:  None reported    Work/Activities: None reported    Leisure: Sports: basketball    Treatment included: OT evaluation and instruction in written HEP including (Hand Therapy/Finger Exercises) Blocking Exercises, Composite Fisting, Flat Fisting,hook fist,tendon glides,extension strengthening exercises when appropriate and Patient reported good understanding of above    Repetitions 10 each   Frequency 3 per day    Pt.provided with blue sponge to begin gentle  strengthening exercises.    Swelling management to include retrograde massage,coban,elevation and icing.    Recommended Wrist,forearm and elbow strengthening exercises and gradual  strengthening exercises to patient's .    One on one exercises: 21 min.    Assessment: Patient is a R handed 23 year old female that is 2 weeks s/p post MCP dislocation of the L Ring finger. Her hand splint was removed in orthopedics today. Her MCP joint appears subluxed, however her MCP range of motion is WNL's. She demonstrated mild swelling of the proximal phalanx and mild limitations and stiffness at the PIP Joint. She also has mild  strength deficits and no complaints of pain with gripping of with range of motion exercises.    Treatment Diagnosis/ Problem List RUE Decreased ROM, Decreased  strength, Decreased muscle strength, Decreased functional hand use and Joint Stiffness      Plan: No further Occupational Therapy at this time. Patient's  will continue with therapy at [facility] and progress patient as tolerated.  Consulted with Solo Sherman in PT (orthopedic therapist that [facility] treats athletes) and he provided recommendations to the pt.'s  as follows: anastasia taping of the RF and MF for the first 2 weeks and to gradually return to playing basketball starting with working on her shooting on day 1 and throwing day 2 and passing day 3 before returning to practice.  Consulted with Michelle Arriaga OT (hand therapist) and she recommended not returning  to play basketball as it is still too early and she is still at risk for re-injury.    Contacted Imani, the pt's  ,via E-mail and recommended that she not start playing basketball this early, secondary to risk of redislocation of the Ring finger MCP joint.